# Patient Record
Sex: MALE | Race: WHITE | NOT HISPANIC OR LATINO | URBAN - METROPOLITAN AREA
[De-identification: names, ages, dates, MRNs, and addresses within clinical notes are randomized per-mention and may not be internally consistent; named-entity substitution may affect disease eponyms.]

---

## 2021-04-12 DIAGNOSIS — Z23 ENCOUNTER FOR IMMUNIZATION: ICD-10-CM

## 2021-04-21 ENCOUNTER — IMMUNIZATIONS (OUTPATIENT)
Dept: FAMILY MEDICINE CLINIC | Facility: HOSPITAL | Age: 56
End: 2021-04-21

## 2021-04-21 DIAGNOSIS — Z23 ENCOUNTER FOR IMMUNIZATION: Primary | ICD-10-CM

## 2021-04-21 PROCEDURE — 0001A SARS-COV-2 / COVID-19 MRNA VACCINE (PFIZER-BIONTECH) 30 MCG: CPT

## 2021-04-21 PROCEDURE — 91300 SARS-COV-2 / COVID-19 MRNA VACCINE (PFIZER-BIONTECH) 30 MCG: CPT

## 2021-05-14 ENCOUNTER — IMMUNIZATIONS (OUTPATIENT)
Dept: FAMILY MEDICINE CLINIC | Facility: HOSPITAL | Age: 56
End: 2021-05-14

## 2021-05-14 DIAGNOSIS — Z23 ENCOUNTER FOR IMMUNIZATION: Primary | ICD-10-CM

## 2021-05-14 PROCEDURE — 0002A SARS-COV-2 / COVID-19 MRNA VACCINE (PFIZER-BIONTECH) 30 MCG: CPT

## 2021-05-14 PROCEDURE — 91300 SARS-COV-2 / COVID-19 MRNA VACCINE (PFIZER-BIONTECH) 30 MCG: CPT

## 2022-01-23 ENCOUNTER — IMMUNIZATIONS (OUTPATIENT)
Dept: FAMILY MEDICINE CLINIC | Facility: HOSPITAL | Age: 57
End: 2022-01-23

## 2022-01-23 DIAGNOSIS — Z23 ENCOUNTER FOR IMMUNIZATION: Primary | ICD-10-CM

## 2022-01-23 PROCEDURE — 91300 COVID-19 PFIZER VACC 0.3 ML: CPT

## 2022-01-23 PROCEDURE — 0001A COVID-19 PFIZER VACC 0.3 ML: CPT

## 2025-01-16 ENCOUNTER — CLINICAL SUPPORT (OUTPATIENT)
Dept: CARDIAC REHAB | Facility: CLINIC | Age: 60
End: 2025-01-16
Payer: COMMERCIAL

## 2025-01-16 DIAGNOSIS — Z95.2 STATUS POST AORTIC VALVE REPLACEMENT: Primary | ICD-10-CM

## 2025-01-16 PROCEDURE — 93798 PHYS/QHP OP CAR RHAB W/ECG: CPT

## 2025-01-16 NOTE — PROGRESS NOTES
CARDIAC REHABILITATION   ASSESSMENT AND INDIVIDUALIZED TREATMENT PLAN  INITIAL           Today's date: 2025   # of Exercise Sessions Completed: 1  Patient name: Ernie Bridges      : 1965  Age: 59 y.o.       MRN: [id number]  Referring Physician: Delvis Inman MD  Cardiologist: Jeferson Inman MD  Provider: Shailesh  Clinician: Leonarda Diaz RN         Treatment is tailored to this patient's individual needs.  The ITP was reviewed with the patient and all questions were answered to their satisfaction.  Additional ITP documentation can be found electronically including daily and monthly exercise summaries, daily session notes with ECG summaries, education notes, daily medication reconciliation, and daily physician supervision.      Comments: Completed initial evaluation. Explained cardiac rehabilitation, cardiac risk factors, how the heart functions, heart healthy diet, RPE scale, THR and exercise.  Completed sub max treadmill test.  Telemetry monitor NSR at rest and with exercise. Exercise MET level 4.3 RPE 3-6. Tolerated exercise well. Denied any complaint of chest discomfort. Provided him with handbook for cardiac rehabilitation and low sodium recipes. Pt stated goals included:  increase endurance to return to swimming and fitness center, continue to walk outdoors, return to using elliptical, continue to consume a heart healthy diet, increase consumption of fruits and vegetables, decrease consumption of weekly large muffins and ice cream.         Dx:   Encounter Diagnosis   Name Primary?    Status post aortic valve replacement Yes       Description of Diagnosis: REPLACEMENT VALVE AORTIC AND ROOT AORTIC (BENTALL PROCEDURE) N/A 2024   Procedure: BENTAL PROCEDURE WITH 29MM KONECT RESILIA /Ster plates; Surgeon: Moses Clayton MD; Location: [facility] Main OR; Service: Cardiac; Laterality: N/A   Date of onset: 2024  Other Cardiac History: Aneurysm of ascending aorta without  rupture (HCC) 12/03/2024   Aortic root aneurysm (HCC)   per patient   Aortic valve stenosis   Arthritis Na   Bicuspid aortic valve 09/15/2023   CAD (coronary artery disease) 12/02/2024   Coronary artery disease involving native coronary artery of native heart with angina pectoris (HCC)   Hypercholesterolemia 09/25/2019   Hypertension, benign 01/31/2014   Hypertriglyceridemia 04/18/2017           ASSESSMENT    Medical History:   Aneurysm of ascending aorta without rupture (HCC) 12/03/2024   Aortic root aneurysm (HCC)   per patient   Aortic valve stenosis   Arthritis Na   Bicuspid aortic valve 09/15/2023   CAD (coronary artery disease) 12/02/2024   Coronary artery disease involving native coronary artery of native heart with angina pectoris (HCC)   COVID-19 2021   Diverticulitis   Dizziness 01/02/2013   Elevated coronary artery calcium score 11/01/2024   [id number]   Heart murmur   Hypercholesterolemia 09/25/2019   Hypertension, benign 01/31/2014   Hypertriglyceridemia 04/18/2017   Other chest pain   Sleep apnea 01/02/2013   lost weight and sleep apnea was resolved per patient   Type 2 diabetes mellitus, without long-term current use of insulin ([facility]) 12/02/2024       Family History:  Stayed he has Family HX  Brother CABG  Father Valve Repair.    Allergies:   Alieve    Current Medications:   No current outpatient medications on file.     No current facility-administered medications for this visit.       Medication compliance: Yes   Comments: Pt reports to be compliant with medications    Physical Limitations: left arm and numbness of two fingers since cardiac surgery    Fall Risk: Low   Comments: Ambulates with a steady gait with no assist device and Denies a fall in the past 6 months    Cultural needs: none      CAD Risk Factors:  Cholesterol: Yes  HTN: Yes  DM: Type 2   PCP monitors A1c not required to monitor home BG  No insulin  Patient stated DM undercontroll with weight loss.   Obesity: No   Inactivity:  No      EXERCISE ASSESSMENT:     Initial Fitness Assessment: Submaximal TM ETT:  Resting:  BP: 122/70  HR: 80, Exercise:  BP: 130/74  HR: 108, METs:  4.3, ECG Summary: NSR, Symptoms: none, and Test terminated at:  RPE 6      ECG INTERPRETATION:  NSR    Current Functional Status:  Occupation: plans to return to work 2/3/2025  Recreation/Physical Activity: walking 40 minutes 3-4 days a week  ADL’s:resumed all ADLs within sternal precautions  Story: resumed all ADLs within sternal precautions  Home exercise: Type: walking , 3-4 days a week for 40 minutes  Other Comments: plans to return to gym, has elliptical at home, prior to surgery was using 4 days a week for 45 minutes      SMART Exercise Goals:   10% improvement in functional capacity based on max METs achieved in initial fitness assessment  maintain > 150 minutes per week of moderate intensity exercise    Patient Specific EXERCISE GOALS:       Increase endurance  Return to the fitness center  Return to using elliptical and swimming    Functional Capacity Screening Tool:  [facility] Activity Status Index:  5.07 METs      PSYCHOSOCIAL ASSESSMENT:    Date of last Assessment:  1/16/2025  Depression screening:  PHQ-9 = 0    Interpretation:  0 =No Depression  Anxiety screening:  YUNG-7 = 1    Interpretation: 0-4  = Not anxious    Pt self-report of depression and anxiety   Patient reports they are coping well with good social support and denies depression or anxiety  Reports sufficient emotional support   Provided contact information for [facility]    Self-reported stress level:   0-1   Stressors:  health  Stress Management Tools: exercise and keep a positive mindset    SMART Psychosocial Goals:     Continue to have positive attitude    Patient Specific PSYCHOCOSOCIAL GOALS:    Stated his worries less about his heart  Will continue to have positive attitude  He states he talks with his spouse and uses prayer for stress relief    Quality of Life Screen:   (Higher score indicates disease impact on QOL)  [facility] COOP score: 22/45     Social Support:   spouse and Sikh family  Community/Social Activities: none     Psychosocial Assessment as it relates to rehabilitation:   Patient denies issues with his/her family or home life that may affect their rehabilitation efforts.       NUTRITION ASSESSMENT:    Initial Weight:  199.5  Current Weight: 199.5    Height:   Ht Readings from Last 1 Encounters:   No data found for Ht       Rate Your Plate Score: 67/81    Diabetes: T2D, diet controlled, Pt not required to monitor home blood sugar, no insulin  A1c: 5.8    last measured: 10/29/2024    Lipid management: Discussed diet and lipid management and Last lipid profile 12/2/2024  Chol 106  TRG 74  HDL 39  LDL 46    Current Dietary Habits:  States he is consuming a heart healthy diet, scored 67 on RYP, will increase consumption of fruits and vegetables.decrease consuming large muffins and ice cream    SMART Nutrition Goals:   eat 5 or more servings of fruits and vegetables a day    Patient Specific NUTRITION GOALS:     1. Decrease consumption of large muffins   2. Increase fruits and vegetables   3. Decrease ice cream consumption    Drug/Alcohol Use:   No      OTHER CORE COMPONENT ASSESSMENT:    Tobacco Use:     Pt quit 15 years ago   and has abstained    Anginal Symptoms:  None   NTG use: No prescription    SMART Goals:   consistent, controlled resting BP < 130/80 and medication compliance    Patient Specific CORE COMPONENT GOALS:    Increase strength and endurance to return to gym  Return to swimming  Return to using elliptical      INDIVIDUALIZED TREATMENT PLAN      EXERCISE GOALS and PLAN      Progress toward Exercise goals:   Reviewed Pt goals and determined plan of care, Will continue to educate and progress as tolerated.    Exercise Plan:    Continue to walk 40 minutes, complete 35 to 45 minutes of aerobic activity increasing time and resistance as tolerated    The patient  was counseled on exercise guidelines to achieve a minimum of 150 mins/wk of moderate intensity (RPE 4-6)   exercise and encouraged to add 1-2 days of exercise on opposite days of cardiac rehab as tolerated.       PHYSICIAN PRESCRIBED EXERCISE:    Current Aerobic Exercise Prescription:      Frequency: 3 days/week   Supplement with home exercise 2+ days/wk as tolerated       Minutes: 20 - 40         METS: 3.0-5.0            HR: Intensity based on RPE 4-6    RPE: 4-6         Modalities: Treadmill, Lifecycle, and Recumbent bike     Exercise workloads will be progressed gradually as tolerated, within limits of patient's ability, and according to the patient's   response to the exercise program.      Aerobic Exercise Prescription Plan for Progression   Frequency: 3 days/week of cardiac rehab       Supplement with home exercise 2+ days/wk as tolerated    Minutes: 40       >150 mins/wk of moderate intensity exercise   METS: 3.0-5.0   HR: Intensity based on RPE 4-6      RPE: 4-6   Modalities: Treadmill, Lifecycle, and Recumbent bike    Strength training:  Will be added following at least 8 weeks post surgery and 8-10 monitored sessions   Modalities: Lateral Raise, Arm Curl, Sit to Stands, Upright Rows, Front Raises, and Shoulder Shrugs    Home Exercise: Type: walking , Duration: 40 mins    Exercise Education: benefit of exercise for CAD risk factors, home exercise guidelines, AHA guidelines to achieve >150 mins/wk of moderate exercise, RPE scale, Group class: Risk Factors for Heart Disease, and physical activity/exercise in extreme weather conditions     Readiness to change: Action:  (Changing behavior)      NUTRITION GOALS AND PLAN      Nutritional   Reviewed patient's Rate your Plate. Discussed key elements of heart healthy eating. Reviewed patient goals for dietary modifications and their clinical implications.  Reviewed most recent lipid profile.     Patient's progress toward Nutrition goals:    Reviewed Pt goals and  determined plan of care, Will continue to educate and progress as tolerated.      Nutrition Plan:   increase daily intake of fruits and vegetables and decrease consumption of ice cream and muffins    Measurable goals were based Rate Your Plate Dietary Self-Assessment. These are the areas in which the patient could score higher on the assessment.  Goals include recommendations for a heart healthy diet based on American Heart Association.    Nutrition Education:   heart healthy eating principles  low sodium diet  nutrition for  lipid management  group class: Heart Healthy Eating    Readiness to change: Preparation:  (Getting ready to change)       PSYCHOSOCIAL GOALS AND PLAN    Psychosocial Assessment as it relates to rehabilitation:   Patient denies issues with his/her family or home life that may affect their rehabilitation efforts.     Patient's progress toward Psychosocial goals:    Reviewed Pt goals and determined plan of care, Will continue to educate and progress as tolerated.    Psychosocial Intervention/plan:   Exercise and Keep a positive mindset    Psychosocial Education: benefits of enrolling in [facility] and depression and CAD    Information to utilize [facility] was provided as well as contact information for counseling through  [facility] and group psychotherapy groups available.    Readiness to change: Action:  (Changing behavior)      OTHER CORE COMPONENTS GOALS and PLAN      Blood Pressure will be monitored throughout the program and cardiologist will be notified of elevated trends.    Pt will be encouraged to monitor home BP if advised by cardiologist.    Tobacco Plan:   Pt quit 15 years ago and has abstained since quitting.      Progress toward Core Component goals:   Reviewed Pt goals and determined plan of care, Goals met: 15 years ago.    Other Core Components Intervention:   group class: Common Heart Medications, medication compliance, engage in regular exercise, and check labels  for sodium content    Group and Individual Education:  group class: Understanding Heart Disease    Readiness to change: Preparation:  (Getting ready to change)

## 2025-01-24 NOTE — PROGRESS NOTES
CARDIAC REHABILITATION   ASSESSMENT AND INDIVIDUALIZED TREATMENT PLAN  INITIAL  and DISCHARGE            Today's date: 2025   # of Exercise Sessions Completed: 1  Patient name: Ernie Bridges      : 1965  Age: 59 y.o.       MRN: [id number]  Referring Physician: Delvis Inman MD  Cardiologist: Jeferson Inman MD  Provider: Shailesh  Clinician: Leonarda Diaz RN         Treatment is tailored to this patient's individual needs.  The ITP was reviewed with the patient and all questions were answered to their satisfaction.  Additional ITP documentation can be found electronically including daily and monthly exercise summaries, daily session notes with ECG summaries, education notes, daily medication reconciliation, and daily physician supervision.      Comments:   2025 Ernie spouse stated via phone today he will not be participating in the cardiac rehabilitation program due to financial reasons.  No change to previous not due to Ernie has not returned to cardiac rehabilitation since initial evaluation on 2025. He has been discharged from the program.     Completed initial evaluation. Explained cardiac rehabilitation, cardiac risk factors, how the heart functions, heart healthy diet, RPE scale, THR and exercise.  Completed sub max treadmill test.  Telemetry monitor NSR at rest and with exercise. Exercise MET level 4.3 RPE 3-6. Tolerated exercise well. Denied any complaint of chest discomfort. Provided him with handbook for cardiac rehabilitation and low sodium recipes. Pt stated goals included:  increase endurance to return to swimming and fitness center, continue to walk outdoors, return to using elliptical, continue to consume a heart healthy diet, increase consumption of fruits and vegetables, decrease consumption of weekly large muffins and ice cream.         Dx:   Encounter Diagnosis   Name Primary?    Status post aortic valve replacement Yes       Description of Diagnosis:  REPLACEMENT VALVE AORTIC AND ROOT AORTIC (BENTALL PROCEDURE) N/A 12/03/2024   Procedure: BENTAL PROCEDURE WITH 29MM KONECT RESILIA /Ster plates; Surgeon: Moses Clayton MD; Location: [facility] Main OR; Service: Cardiac; Laterality: N/A   Date of onset: 12/2/2024  Other Cardiac History: Aneurysm of ascending aorta without rupture (HCC) 12/03/2024   Aortic root aneurysm (HCC)   per patient   Aortic valve stenosis   Arthritis Na   Bicuspid aortic valve 09/15/2023   CAD (coronary artery disease) 12/02/2024   Coronary artery disease involving native coronary artery of native heart with angina pectoris (HCC)   Hypercholesterolemia 09/25/2019   Hypertension, benign 01/31/2014   Hypertriglyceridemia 04/18/2017           ASSESSMENT    Medical History:   Aneurysm of ascending aorta without rupture (HCC) 12/03/2024   Aortic root aneurysm (HCC)   per patient   Aortic valve stenosis   Arthritis Na   Bicuspid aortic valve 09/15/2023   CAD (coronary artery disease) 12/02/2024   Coronary artery disease involving native coronary artery of native heart with angina pectoris (HCC)   COVID-19 2021   Diverticulitis   Dizziness 01/02/2013   Elevated coronary artery calcium score 11/01/2024   [id number]   Heart murmur   Hypercholesterolemia 09/25/2019   Hypertension, benign 01/31/2014   Hypertriglyceridemia 04/18/2017   Other chest pain   Sleep apnea 01/02/2013   lost weight and sleep apnea was resolved per patient   Type 2 diabetes mellitus, without long-term current use of insulin (HCC) 12/02/2024       Family History:  Stayed he has Family HX  Brother CABG  Father Valve Repair.    Allergies:   Alieve    Current Medications:   No current outpatient medications on file.     No current facility-administered medications for this visit.       Medication compliance: Yes   Comments: Pt reports to be compliant with medications    Physical Limitations: left arm and numbness of two fingers since cardiac surgery    Fall Risk:  Low   Comments: Ambulates with a steady gait with no assist device and Denies a fall in the past 6 months    Cultural needs: none      CAD Risk Factors:  Cholesterol: Yes  HTN: Yes  DM: Type 2   PCP monitors A1c not required to monitor home BG  No insulin  Patient stated DM undercontroll with weight loss.   Obesity: No   Inactivity: No      EXERCISE ASSESSMENT:     Initial Fitness Assessment: Submaximal TM ETT:  Resting:  BP: 122/70  HR: 80, Exercise:  BP: 130/74  HR: 108, METs:  4.3, ECG Summary: NSR, Symptoms: none, and Test terminated at:  RPE 6      ECG INTERPRETATION:  NSR    Current Functional Status:  Occupation: plans to return to work 2/3/2025  Recreation/Physical Activity: walking 40 minutes 3-4 days a week  ADL’s:resumed all ADLs within sternal precautions  [place]: resumed all ADLs within sternal precautions  Home exercise: Type: walking , 3-4 days a week for 40 minutes  Other Comments: plans to return to gym, has elliptical at home, prior to surgery was using 4 days a week for 45 minutes      SMART Exercise Goals:   10% improvement in functional capacity based on max METs achieved in initial fitness assessment  maintain > 150 minutes per week of moderate intensity exercise    Patient Specific EXERCISE GOALS:       Increase endurance  Return to the fitness center  Return to using elliptical and swimming    Functional Capacity Screening Tool:  [facility] Activity Status Index:  5.07 METs      PSYCHOSOCIAL ASSESSMENT:    Date of last Assessment:  1/16/2025  Depression screening:  PHQ-9 = 0    Interpretation:  0 =No Depression  Anxiety screening:  YUNG-7 = 1    Interpretation: 0-4  = Not anxious    Pt self-report of depression and anxiety   Patient reports they are coping well with good social support and denies depression or anxiety  Reports sufficient emotional support   Provided contact information for [facility]    Self-reported stress level:   0-1   Stressors:  health  Stress Management  Tools: exercise and keep a positive mindset    SMART Psychosocial Goals:     Continue to have positive attitude    Patient Specific PSYCHOCOSOCIAL GOALS:    Stated his worries less about his heart  Will continue to have positive attitude  He states he talks with his spouse and uses prayer for stress relief    Quality of Life Screen:  (Higher score indicates disease impact on QOL)  [facility] COOP score: 22/45     Social Support:   spouse and [employer] family  Community/Social Activities: none     Psychosocial Assessment as it relates to rehabilitation:   Patient denies issues with his/her family or home life that may affect their rehabilitation efforts.       NUTRITION ASSESSMENT:    Initial Weight:  199.5  Current Weight: 199.5    Height:   Ht Readings from Last 1 Encounters:   No data found for Ht       Rate Your Plate Score: 67/81    Diabetes: T2D, diet controlled, Pt not required to monitor home blood sugar, no insulin  A1c: 5.8    last measured: 10/29/2024    Lipid management: Discussed diet and lipid management and Last lipid profile 12/2/2024  Chol 106  TRG 74  HDL 39  LDL 46    Current Dietary Habits:  States he is consuming a heart healthy diet, scored 67 on RYP, will increase consumption of fruits and vegetables.decrease consuming large muffins and ice cream    SMART Nutrition Goals:   eat 5 or more servings of fruits and vegetables a day    Patient Specific NUTRITION GOALS:     1. Decrease consumption of large muffins   2. Increase fruits and vegetables   3. Decrease ice cream consumption    Drug/Alcohol Use:   No      OTHER CORE COMPONENT ASSESSMENT:    Tobacco Use:     Pt quit 15 years ago   and has abstained    Anginal Symptoms:  None   NTG use: No prescription    SMART Goals:   consistent, controlled resting BP < 130/80 and medication compliance    Patient Specific CORE COMPONENT GOALS:    Increase strength and endurance to return to gym  Return to swimming  Return to using elliptical      INDIVIDUALIZED  TREATMENT PLAN      EXERCISE GOALS and PLAN      Progress toward Exercise goals:   1/24/2025 Ernie spouse stated via phone today he will not be participating in the cardiac rehabilitation program due to financial reasons.  No change to previous not due to Ernie has not returned to cardiac rehabilitation since initial evaluation on 1/16/2025. He has been discharged from the program.       Exercise Plan:    Continue to walk 40 minutes, complete 35 to 45 minutes of aerobic activity increasing time and resistance as tolerated    The patient was counseled on exercise guidelines to achieve a minimum of 150 mins/wk of moderate intensity (RPE 4-6)   exercise and encouraged to add 1-2 days of exercise on opposite days of cardiac rehab as tolerated.       PHYSICIAN PRESCRIBED EXERCISE:    Current Aerobic Exercise Prescription:      Frequency: 3 days/week   Supplement with home exercise 2+ days/wk as tolerated       Minutes: 20 - 40         METS: 3.0-5.0            HR: Intensity based on RPE 4-6    RPE: 4-6         Modalities: Treadmill, Lifecycle, and Recumbent bike     Exercise workloads will be progressed gradually as tolerated, within limits of patient's ability, and according to the patient's   response to the exercise program.      Aerobic Exercise Prescription Plan for Progression   Frequency: 3 days/week of cardiac rehab       Supplement with home exercise 2+ days/wk as tolerated    Minutes: 40       >150 mins/wk of moderate intensity exercise   METS: 3.0-5.0   HR: Intensity based on RPE 4-6      RPE: 4-6   Modalities: Treadmill, Lifecycle, and Recumbent bike    Strength training:  Will be added following at least 8 weeks post surgery and 8-10 monitored sessions   Modalities: Lateral Raise, Arm Curl, Sit to Stands, Upright Rows, Front Raises, and Shoulder Shrugs    Home Exercise: Type: walking , Duration: 40 mins    Exercise Education: benefit of exercise for CAD risk factors, home exercise guidelines, AHA guidelines  to achieve >150 mins/wk of moderate exercise, RPE scale, Group class: Risk Factors for Heart Disease, and physical activity/exercise in extreme weather conditions     Readiness to change: Action:  (Changing behavior)      NUTRITION GOALS AND PLAN      Nutritional   Reviewed patient's Rate your Plate. Discussed key elements of heart healthy eating. Reviewed patient goals for dietary modifications and their clinical implications.  Reviewed most recent lipid profile.     Patient's progress toward Nutrition goals:    1/24/2025 Ernie spouse stated via phone today he will not be participating in the cardiac rehabilitation program due to financial reasons.  No change to previous not due to Ernie has not returned to cardiac rehabilitation since initial evaluation on 1/16/2025. He has been discharged from the program.         Nutrition Plan:   increase daily intake of fruits and vegetables and decrease consumption of ice cream and muffins    Measurable goals were based Rate Your Plate Dietary Self-Assessment. These are the areas in which the patient could score higher on the assessment.  Goals include recommendations for a heart healthy diet based on American Heart Association.    Nutrition Education:   heart healthy eating principles  low sodium diet  nutrition for  lipid management  group class: Heart Healthy Eating    Readiness to change: Preparation:  (Getting ready to change)       PSYCHOSOCIAL GOALS AND PLAN    Psychosocial Assessment as it relates to rehabilitation:   Patient denies issues with his/her family or home life that may affect their rehabilitation efforts.     Patient's progress toward Psychosocial goals:      1/24/2025 Ernie spouse stated via phone today he will not be participating in the cardiac rehabilitation program due to financial reasons.  No change to previous not due to Ernie has not returned to cardiac rehabilitation since initial evaluation on 1/16/2025. He has been discharged from the program.      Psychosocial Intervention/plan:   Exercise and Keep a positive mindset    Psychosocial Education: benefits of enrolling in [facility] and depression and CAD    Information to utilize [facility] was provided as well as contact information for counseling through  [facility] and group psychotherapy groups available.    Readiness to change: Action:  (Changing behavior)      OTHER CORE COMPONENTS GOALS and PLAN      Blood Pressure will be monitored throughout the program and cardiologist will be notified of elevated trends.    Pt will be encouraged to monitor home BP if advised by cardiologist.    Tobacco Plan:   Pt quit 15 years ago and has abstained since quitting.      Progress toward Core Component goals:   Reviewed Pt goals and determined plan of care, Goals met: 15 years ago.    Other Core Components Intervention:   group class: Common Heart Medications, medication compliance, engage in regular exercise, and check labels for sodium content    Group and Individual Education:  group class: Understanding Heart Disease    Readiness to change: Preparation:  (Getting ready to change)